# Patient Record
Sex: FEMALE | Employment: FULL TIME
[De-identification: names, ages, dates, MRNs, and addresses within clinical notes are randomized per-mention and may not be internally consistent; named-entity substitution may affect disease eponyms.]

---

## 2024-08-13 ENCOUNTER — TRANSCRIBE ORDERS (OUTPATIENT)
Dept: OTHER | Age: 24
End: 2024-08-13

## 2024-08-13 DIAGNOSIS — M54.6 ACUTE MIDLINE THORACIC BACK PAIN: Primary | ICD-10-CM

## 2024-08-19 ENCOUNTER — THERAPY VISIT (OUTPATIENT)
Dept: PHYSICAL THERAPY | Facility: CLINIC | Age: 24
End: 2024-08-19
Attending: PHYSICIAN ASSISTANT
Payer: COMMERCIAL

## 2024-08-19 DIAGNOSIS — M54.6 CHRONIC MIDLINE THORACIC BACK PAIN: Primary | ICD-10-CM

## 2024-08-19 DIAGNOSIS — G89.29 CHRONIC MIDLINE THORACIC BACK PAIN: Primary | ICD-10-CM

## 2024-08-19 PROCEDURE — 97110 THERAPEUTIC EXERCISES: CPT | Mod: GP | Performed by: PHYSICAL THERAPIST

## 2024-08-19 PROCEDURE — 97161 PT EVAL LOW COMPLEX 20 MIN: CPT | Mod: GP | Performed by: PHYSICAL THERAPIST

## 2024-08-19 NOTE — PROGRESS NOTES
PHYSICAL THERAPY EVALUATION  Type of Visit: Evaluation       Fall Risk Screen:  Fall screen completed by: PT  Have you fallen 2 or more times in the past year?: No  Have you fallen and had an injury in the past year?: No  Is patient a fall risk?: No    Subjective       Presenting condition or subjective complaint: Back pain. Upper back pain since March. Gradual onset without specific injury or provoking activities. Had her wisdom teeth out earlier this year and noticed the pain after laying around more. Left sided more than right but can be very central.  Date of onset: 03/15/24 (date order received 8/13/2024)    Relevant medical history: Asthma; Concussions; Depression; Mental Illness; Pain at night or rest   Dates & types of surgery: Tonsilectomy 2012 wisdom teeth removal 2024 Tubes in ears 2000    Prior diagnostic imaging/testing results: X-ray     Prior therapy history for the same diagnosis, illness or injury: No        Living Environment  Social support: Alone   Type of home: Apartment/condo   Stairs to enter the home: Yes       Ramp: No   Stairs inside the home: No       Help at home: None  Equipment owned:       Employment: Yes   Hobbies/Interests: tennis, running, dancing, very active    Patient goals for therapy: sleep    Pain assessment: See objective evaluation for additional pain details     Objective   CERVICAL SPINE EVALUATION  PAIN: Pain Level at Rest: 0/10  Pain Level with Use: 7/10  Pain Location: mid-lower thoracic spine, left>right but very central  Pain Quality: can be spasmy/grabbing, usually a tightness  Pain Frequency: intermittent  Pain is Exacerbated By: sleeping, taking deep breaths at times, deadlift motion  Pain is Relieved By: the more active, the better she feels  Pain Progression: Worsened    Coughing bothers the pain but not sneezing.  Always worst when sedentary and best when moving some.  Generally flexible.  Stays active each day: coaches tennis, goes on walk, 1x/week  strength training    INTEGUMENTARY (edema, incisions):   POSTURE:  slight L lateral curvature of lumbar spine ; forward head and rounded shoulders  GAIT:   Weightbearing Status:   Assistive Device(s):   Gait Deviations:   BALANCE/PROPRIOCEPTION:   WEIGHTBEARING ALIGNMENT:   ROM:   (Degrees) Left AROM Right AROM    Cervical Flexion All neck WNL    Cervical Extension     Cervical Side bend      Cervical Rotation      Cervical Protrusion     Cervical Retraction     Thoracic Flexion WNL    Thoracic Extension Mod loss    Thoracic Rotation Mod loss Min loss     Left AROM Left PROM Right AROM Right PROM   Shoulder Flexion All shoulder WNL      Shoulder Extension       Shoulder Abduction       Shoulder Adduction       Shoulder IR       Shoulder ER       Shoulder Horiz Abduction       Shoulder Horiz Adduction       Pain:   End Feel:   Lumbar ROM: flexion to ankles with minimal upper lumbar motion, extension min loss and some pain        MYOTOMES: WNL  DTR S:   CORD SIGNS:   DERMATOMES:   NEURAL TENSION:   FLEXIBILITY:    SPECIAL TESTS:  L spring test positive T10-12 with some radicular pain into lateral ribs  PALPATION: no focal pain at costovertebral or costotransverse joints but does have muscle guarding; no maltracking of ribs with rotation, flexion or sidebending noted but did cause spasms after testing.  SPINAL SEGMENTAL CONCLUSIONS:  T10-T12 very hypomobile all directions, some T6-10 and L1-3 but less so      Assessment & Plan   CLINICAL IMPRESSIONS  Medical Diagnosis: Thoracic pain    Treatment Diagnosis: Chronic thoracic pain   Impression/Assessment: Patient is a 24 year old female with thoracic complaints.  The following significant findings have been identified: Pain, Decreased ROM/flexibility, Decreased joint mobility, Decreased strength, and Impaired posture. These impairments interfere with their ability to perform work tasks, recreational activities, household chores, and driving  as compared to previous level  of function.     Clinical Decision Making (Complexity):  Clinical Presentation: Stable/Uncomplicated  Clinical Presentation Rationale: based on medical and personal factors listed in PT evaluation  Clinical Decision Making (Complexity): Low complexity    PLAN OF CARE  Treatment Interventions:  Modalities: Cryotherapy, E-stim, Hot Pack, Ultrasound  Interventions: Manual Therapy, Neuromuscular Re-education, Therapeutic Activity, Therapeutic Exercise    Long Term Goals     PT Goal 1  Goal Identifier: LTG 1  Goal Description: Patient will be able to sleep through the night without pain and have no pain upon waking in the thoracic region  Rationale:  (for restorative sleep pattern)  Target Date: 09/30/24      Frequency of Treatment: 1x/week  Duration of Treatment: 6 weeks    Recommended Referrals to Other Professionals:  NONE  Education Assessment:   Learner/Method: Patient;No Barriers to Learning    Risks and benefits of evaluation/treatment have been explained.   Patient/Family/caregiver agrees with Plan of Care.     Evaluation Time:     PT Eval, Low Complexity Minutes (78730): 25     Signing Clinician: Stephane Hood, PT

## 2024-09-16 ENCOUNTER — THERAPY VISIT (OUTPATIENT)
Dept: PHYSICAL THERAPY | Facility: CLINIC | Age: 24
End: 2024-09-16
Payer: COMMERCIAL

## 2024-09-16 DIAGNOSIS — M54.6 CHRONIC MIDLINE THORACIC BACK PAIN: Primary | ICD-10-CM

## 2024-09-16 DIAGNOSIS — G89.29 CHRONIC MIDLINE THORACIC BACK PAIN: Primary | ICD-10-CM

## 2024-09-16 PROCEDURE — 97110 THERAPEUTIC EXERCISES: CPT | Mod: GP | Performed by: PHYSICAL THERAPIST

## 2024-09-16 PROCEDURE — 97140 MANUAL THERAPY 1/> REGIONS: CPT | Mod: GP | Performed by: PHYSICAL THERAPIST

## 2024-11-07 ENCOUNTER — THERAPY VISIT (OUTPATIENT)
Dept: PHYSICAL THERAPY | Facility: CLINIC | Age: 24
End: 2024-11-07
Payer: COMMERCIAL

## 2024-11-07 DIAGNOSIS — M54.6 CHRONIC MIDLINE THORACIC BACK PAIN: Primary | ICD-10-CM

## 2024-11-07 DIAGNOSIS — G89.29 CHRONIC MIDLINE THORACIC BACK PAIN: Primary | ICD-10-CM

## 2024-11-07 PROCEDURE — 97140 MANUAL THERAPY 1/> REGIONS: CPT | Mod: GP | Performed by: PHYSICAL THERAPIST

## 2024-11-07 PROCEDURE — 97110 THERAPEUTIC EXERCISES: CPT | Mod: GP | Performed by: PHYSICAL THERAPIST

## 2025-01-19 ENCOUNTER — HEALTH MAINTENANCE LETTER (OUTPATIENT)
Age: 25
End: 2025-01-19

## 2025-01-20 ENCOUNTER — THERAPY VISIT (OUTPATIENT)
Dept: PHYSICAL THERAPY | Facility: CLINIC | Age: 25
End: 2025-01-20
Payer: COMMERCIAL

## 2025-01-20 DIAGNOSIS — M54.6 CHRONIC MIDLINE THORACIC BACK PAIN: Primary | ICD-10-CM

## 2025-01-20 DIAGNOSIS — G89.29 CHRONIC MIDLINE THORACIC BACK PAIN: Primary | ICD-10-CM

## 2025-01-20 PROCEDURE — 97110 THERAPEUTIC EXERCISES: CPT | Mod: GP | Performed by: PHYSICAL THERAPIST

## 2025-01-20 NOTE — PROGRESS NOTES
Physical Therapy Progress Note       01/20/25 0500   Appointment Info   Signing clinician's name / credentials Stephane Hood, PT, DPT   Total/Authorized Visits 6 (MD order)   Visits Used 4   Medical Diagnosis Thoracic pain   PT Tx Diagnosis Chronic thoracic pain   Other pertinent information Hypomobility of lower thoracic spine, likely radicular into left rib cage area; cannot rule out rib involvement upon eval (no maltracking noted of ribs but did cause spasming after testing/stressing area)   Progress Note/Certification   Onset of illness/injury or Date of Surgery 03/15/24  (date order received 8/13/2024)   Therapy Frequency 1x/month   Predicted Duration 2 additional months   Progress Note Completed Date 08/19/24   PT Goal 1   Goal Identifier LTG 1   Goal Description Patient will be able to sleep through the night without pain and have no pain upon waking in the thoracic region   Rationale   (for restorative sleep pattern)   Goal Progress still rough at points; sleeping more elevated or on hard surface seems to help but persistent pain most nights -- goal extended given gaps in care   Target Date 03/17/25   Subjective Report   Subjective Report Feeling good to ok. Was pretty sick earlier this month with more stiffness from coughing but moving better since then. Central lower thoracic spine still main pain area. Doing more strengthening/weight training at the gym   Objective Measures   Objective Measures Objective Measure 2   Objective Measure 1   Objective Measure Pain level   Details 0/10 current; worst in the last week 4/10 (no longer waking her up at night). No zingers into rib cage area.   Objective Measure 2   Objective Measure Thoracic ROM   Details ext min loss thoracic and lumbar, all else about normal and pain free   Treatment Interventions (PT)   Interventions Manual Therapy   Therapeutic Procedure/Exercise   Therapeutic Procedures: strength, endurance, ROM, flexibility minutes (79244) 38   Therapeutic  "Procedures Ther Proc 2   Ther Proc 1 Lumbar extension in standing   Ther Proc 1 - Details x10   PTRx Ther Proc 1 Prone scapular T to Y   PTRx Ther Proc 1 - Details 2x5 then 10\" holds for T and Y   PTRx Ther Proc 2 Standing trunk rotations with band   PTRx Ther Proc 2 - Details black band 2x10 B   PTRx Ther Proc 3 - Details Extra time for remeasurements, strength testing, review of goals and expected outcomes   PTRx Ther Proc 4 - Details Crawford and Arrow Stretch   PTRx Ther Proc 5 x10   PTRx Ther Proc 5 - Details Thoracic Elongation Latissimus Dorsi Stretch   PTRx Ther Proc 6 reviewed   Skilled Intervention HEP instruction, modifications based on pain and muscle performance   Patient Response/Progress Well tolerated with appropriate fatigue. Really felt in the area of her pain with the prone exercises   Ther Proc 2 Prone scapular I to T   Ther Proc 2 - Details 2x10   Education   Learner/Method Patient;No Barriers to Learning   Plan   Home program PTRX link on phone   Plan for next session continue with thoracic   Total Session Time   Timed Code Treatment Minutes 38   Total Treatment Time (sum of timed and untimed services) 38         ASSESSMENT  Marilee Sinclair demonstrates continued mid thoracic pain but is able to tolerate more at the gym and is sleeping slightly better. Continued skilled PT indicated to progress strengthening and stability in the involved area to reduce risk of pain reoccurrence.    PLAN  1x/month for 2 months    Beginning/End Dates of Progress Note Reporting Period:  08/19/24 to 01/20/2025    Referring Provider:  Sweta Dewey    "